# Patient Record
Sex: FEMALE | Race: WHITE | NOT HISPANIC OR LATINO | URBAN - METROPOLITAN AREA
[De-identification: names, ages, dates, MRNs, and addresses within clinical notes are randomized per-mention and may not be internally consistent; named-entity substitution may affect disease eponyms.]

---

## 2024-07-08 ENCOUNTER — OFFICE VISIT (OUTPATIENT)
Dept: URBAN - METROPOLITAN AREA CLINIC 70 | Facility: CLINIC | Age: 69
End: 2024-07-08
Payer: MEDICARE

## 2024-07-08 ENCOUNTER — LAB OUTSIDE AN ENCOUNTER (OUTPATIENT)
Dept: URBAN - METROPOLITAN AREA CLINIC 70 | Facility: CLINIC | Age: 69
End: 2024-07-08

## 2024-07-08 ENCOUNTER — DASHBOARD ENCOUNTERS (OUTPATIENT)
Age: 69
End: 2024-07-08

## 2024-07-08 VITALS
HEART RATE: 82 BPM | HEIGHT: 63 IN | BODY MASS INDEX: 34.41 KG/M2 | DIASTOLIC BLOOD PRESSURE: 94 MMHG | SYSTOLIC BLOOD PRESSURE: 145 MMHG | TEMPERATURE: 97.7 F | WEIGHT: 194.2 LBS

## 2024-07-08 DIAGNOSIS — K59.04 CHRONIC IDIOPATHIC CONSTIPATION: ICD-10-CM

## 2024-07-08 DIAGNOSIS — N83.8 OVARIAN MASS: ICD-10-CM

## 2024-07-08 DIAGNOSIS — K80.20 CHOLELITHIASIS: ICD-10-CM

## 2024-07-08 DIAGNOSIS — R10.11 RUQ ABDOMINAL PAIN: ICD-10-CM

## 2024-07-08 PROCEDURE — 99204 OFFICE O/P NEW MOD 45 MIN: CPT | Performed by: REGISTERED NURSE

## 2024-07-08 RX ORDER — GLIPIZIDE 5 MG/1
1 TABLET 30 MINUTES BEFORE BREAKFAST TABLET ORAL ONCE A DAY
Status: ACTIVE | COMMUNITY

## 2024-07-08 RX ORDER — ALPRAZOLAM 1 MG/1
TAKE 1 TABLET (1 MG) BY ORAL ROUTE 2 TIMES PER DAY TABLET ORAL
Qty: 0 | Refills: 0 | Status: DISCONTINUED | COMMUNITY
Start: 1900-01-01

## 2024-07-08 RX ORDER — ATENOLOL 100 MG/1
TAKE 1 TABLET (50 MG) BY ORAL ROUTE ONCE DAILY TABLET ORAL 1
Qty: 0 | Refills: 0 | Status: ACTIVE | COMMUNITY
Start: 1900-01-01

## 2024-07-08 RX ORDER — LEVOTHYROXINE SODIUM 112 UG/1
1 TABLET IN THE MORNING ON AN EMPTY STOMACH TABLET ORAL ONCE A DAY
Status: ACTIVE | COMMUNITY

## 2024-07-08 RX ORDER — AMLODIPINE AND BENAZEPRIL HYDROCHLORIDE 2.5; 1 MG/1; MG/1
CAPSULE ORAL
Qty: 100 CAPSULE | Status: ACTIVE | COMMUNITY

## 2024-07-08 RX ORDER — OMEPRAZOLE 40 MG/1
1 CAPSULE 30 MINUTES BEFORE MORNING MEAL CAPSULE, DELAYED RELEASE ORAL ONCE A DAY
Qty: 90 | Refills: 3 | OUTPATIENT

## 2024-07-08 RX ORDER — LISINOPRIL 20 MG/1
TAKE 1 TABLET (20 MG) BY ORAL ROUTE ONCE DAILY TABLET ORAL 1
Qty: 0 | Refills: 0 | Status: DISCONTINUED | COMMUNITY
Start: 1900-01-01

## 2024-07-08 RX ORDER — OMEPRAZOLE 20 MG/1
1 CAPSULE 30 MINUTES BEFORE MORNING MEAL CAPSULE, DELAYED RELEASE ORAL ONCE A DAY
Status: ACTIVE | COMMUNITY

## 2024-07-08 RX ORDER — ATORVASTATIN CALCIUM 10 MG/1
1 TABLET TABLET, FILM COATED ORAL ONCE A DAY
Status: ACTIVE | COMMUNITY

## 2024-07-08 RX ORDER — PANTOPRAZOLE SODIUM 40 MG/1
TAKE 1 TABLET (40 MG) BY ORAL ROUTE ONCE DAILY TABLET, DELAYED RELEASE ORAL 1
Qty: 0 | Refills: 0 | Status: DISCONTINUED | COMMUNITY
Start: 1900-01-01

## 2024-07-08 RX ORDER — METFORMIN HYDROCHLORIDE 500 MG/1
TAKE 1 TABLET (500 MG) BY ORAL ROUTE 2 TIMES PER DAY WITH MORNING AND EVENING MEALS TABLET, COATED ORAL 2
Qty: 0 | Refills: 0 | Status: ACTIVE | COMMUNITY
Start: 1900-01-01

## 2024-07-08 RX ORDER — VENLAFAXINE HYDROCHLORIDE 37.5 MG/1
1 TABLET WITH FOOD TABLET ORAL ONCE A DAY
Status: ACTIVE | COMMUNITY

## 2024-07-08 RX ORDER — PIOGLITAZONE 15 MG/1
1 TABLET TABLET ORAL ONCE A DAY
Status: ACTIVE | COMMUNITY

## 2024-07-08 NOTE — HPI-TODAY'S VISIT:
Pt presents with abdominal pain. Pain has been present for 2-3 months but has progressively worsened. Pain is located in the RUQ. Pain occurs for hours at a time. Pain is described as sharp. Pain is aggravated by eating. Pain is alleviated by nothing in particular. Pain occurs daily. Associated symptoms are occasional nausea. She has been taking omeprazole 20mg daily for a month with no improvement.  She was seen in the ED 6/26/24 for the pain. HGB, lipase, and LFTs were normal. CT scan showed fatty liver, gallstones, moderate stool burden, diverticulosis, and a 5 x 5cm fluid filled structure in the right adnexa region.  Gallbladder US showed gallstones and minimal ascites adjacent to the hepatic margin Pelvic US showed a complex right adnexal mass. She was seen by a general surgeon and is scheduled for a hidascan. She has an appointment with gynecology next week.  She also reports chronic constipation. Bowels move every 3-4 days.  EGD 1/8/19 showed nonbleeding gastric ulcers and candida esophagitis She reports a normal colonoscopy in 2015.

## 2024-07-17 ENCOUNTER — CLAIMS CREATED FROM THE CLAIM WINDOW (OUTPATIENT)
Dept: URBAN - METROPOLITAN AREA SURGERY CENTER 24 | Facility: SURGERY CENTER | Age: 69
End: 2024-07-17
Payer: MEDICARE

## 2024-07-17 ENCOUNTER — CLAIMS CREATED FROM THE CLAIM WINDOW (OUTPATIENT)
Dept: URBAN - METROPOLITAN AREA CLINIC 4 | Facility: CLINIC | Age: 69
End: 2024-07-17
Payer: MEDICARE

## 2024-07-17 DIAGNOSIS — K31.89 OTHER DISEASES OF STOMACH AND DUODENUM: ICD-10-CM

## 2024-07-17 DIAGNOSIS — R10.13 ABDOMINAL DISCOMFORT, EPIGASTRIC: ICD-10-CM

## 2024-07-17 DIAGNOSIS — K31.89 ACHYLIA: ICD-10-CM

## 2024-07-17 DIAGNOSIS — K29.70 GASTRITIS, UNSPECIFIED, WITHOUT BLEEDING: ICD-10-CM

## 2024-07-17 PROCEDURE — 88305 TISSUE EXAM BY PATHOLOGIST: CPT | Performed by: PATHOLOGY

## 2024-07-17 PROCEDURE — 43239 EGD BIOPSY SINGLE/MULTIPLE: CPT | Performed by: INTERNAL MEDICINE

## 2024-07-17 PROCEDURE — 00731 ANES UPR GI NDSC PX NOS: CPT | Performed by: NURSE ANESTHETIST, CERTIFIED REGISTERED

## 2024-07-17 RX ORDER — OMEPRAZOLE 40 MG/1
1 CAPSULE 30 MINUTES BEFORE MORNING MEAL CAPSULE, DELAYED RELEASE ORAL ONCE A DAY
Qty: 90 | Refills: 3 | Status: ACTIVE | COMMUNITY

## 2024-07-17 RX ORDER — ATORVASTATIN CALCIUM 10 MG/1
1 TABLET TABLET, FILM COATED ORAL ONCE A DAY
Status: ACTIVE | COMMUNITY

## 2024-07-17 RX ORDER — ATENOLOL 100 MG/1
TAKE 1 TABLET (50 MG) BY ORAL ROUTE ONCE DAILY TABLET ORAL 1
Qty: 0 | Refills: 0 | Status: ACTIVE | COMMUNITY
Start: 1900-01-01

## 2024-07-17 RX ORDER — LEVOTHYROXINE SODIUM 112 UG/1
1 TABLET IN THE MORNING ON AN EMPTY STOMACH TABLET ORAL ONCE A DAY
Status: ACTIVE | COMMUNITY

## 2024-07-17 RX ORDER — VENLAFAXINE HYDROCHLORIDE 37.5 MG/1
1 TABLET WITH FOOD TABLET ORAL ONCE A DAY
Status: ACTIVE | COMMUNITY

## 2024-07-17 RX ORDER — PIOGLITAZONE 15 MG/1
1 TABLET TABLET ORAL ONCE A DAY
Status: ACTIVE | COMMUNITY

## 2024-07-17 RX ORDER — GLIPIZIDE 5 MG/1
1 TABLET 30 MINUTES BEFORE BREAKFAST TABLET ORAL ONCE A DAY
Status: ACTIVE | COMMUNITY

## 2024-07-17 RX ORDER — AMLODIPINE AND BENAZEPRIL HYDROCHLORIDE 2.5; 1 MG/1; MG/1
CAPSULE ORAL
Qty: 100 CAPSULE | Status: ACTIVE | COMMUNITY

## 2024-07-17 RX ORDER — METFORMIN HYDROCHLORIDE 500 MG/1
TAKE 1 TABLET (500 MG) BY ORAL ROUTE 2 TIMES PER DAY WITH MORNING AND EVENING MEALS TABLET, COATED ORAL 2
Qty: 0 | Refills: 0 | Status: ACTIVE | COMMUNITY
Start: 1900-01-01

## 2024-08-14 ENCOUNTER — OFFICE VISIT (OUTPATIENT)
Dept: URBAN - METROPOLITAN AREA CLINIC 70 | Facility: CLINIC | Age: 69
End: 2024-08-14

## 2024-08-19 ENCOUNTER — OFFICE VISIT (OUTPATIENT)
Dept: URBAN - METROPOLITAN AREA CLINIC 70 | Facility: CLINIC | Age: 69
End: 2024-08-19
Payer: MEDICARE

## 2024-08-19 VITALS
TEMPERATURE: 98.1 F | BODY MASS INDEX: 33.17 KG/M2 | HEART RATE: 109 BPM | SYSTOLIC BLOOD PRESSURE: 118 MMHG | DIASTOLIC BLOOD PRESSURE: 82 MMHG | HEIGHT: 63 IN | WEIGHT: 187.2 LBS

## 2024-08-19 DIAGNOSIS — K59.04 CHRONIC IDIOPATHIC CONSTIPATION: ICD-10-CM

## 2024-08-19 DIAGNOSIS — K80.20 CHOLELITHIASIS: ICD-10-CM

## 2024-08-19 PROCEDURE — 99213 OFFICE O/P EST LOW 20 MIN: CPT | Performed by: REGISTERED NURSE

## 2024-08-19 RX ORDER — ATORVASTATIN CALCIUM 10 MG/1
1 TABLET TABLET, FILM COATED ORAL ONCE A DAY
Status: ACTIVE | COMMUNITY

## 2024-08-19 RX ORDER — METFORMIN HYDROCHLORIDE 500 MG/1
TAKE 1 TABLET (500 MG) BY ORAL ROUTE 2 TIMES PER DAY WITH MORNING AND EVENING MEALS TABLET, COATED ORAL 2
Qty: 0 | Refills: 0 | Status: ACTIVE | COMMUNITY
Start: 1900-01-01

## 2024-08-19 RX ORDER — VENLAFAXINE HYDROCHLORIDE 37.5 MG/1
1 TABLET WITH FOOD TABLET ORAL ONCE A DAY
Status: ACTIVE | COMMUNITY

## 2024-08-19 RX ORDER — OMEPRAZOLE 40 MG/1
1 CAPSULE 30 MINUTES BEFORE MORNING MEAL CAPSULE, DELAYED RELEASE ORAL ONCE A DAY
Qty: 90 | Refills: 3 | Status: DISCONTINUED | COMMUNITY

## 2024-08-19 RX ORDER — GLIPIZIDE 5 MG/1
1 TABLET 30 MINUTES BEFORE BREAKFAST TABLET ORAL ONCE A DAY
Status: ACTIVE | COMMUNITY

## 2024-08-19 RX ORDER — ATENOLOL 100 MG/1
TAKE 1 TABLET (50 MG) BY ORAL ROUTE ONCE DAILY TABLET ORAL 1
Qty: 0 | Refills: 0 | Status: ACTIVE | COMMUNITY
Start: 1900-01-01

## 2024-08-19 RX ORDER — FAMOTIDINE 20 MG/1
TABLET, FILM COATED ORAL
Qty: 60 TABLET | Status: ACTIVE | COMMUNITY

## 2024-08-19 RX ORDER — LEVOTHYROXINE SODIUM 112 UG/1
1 TABLET IN THE MORNING ON AN EMPTY STOMACH TABLET ORAL ONCE A DAY
Status: ACTIVE | COMMUNITY

## 2024-08-19 RX ORDER — PIOGLITAZONE 15 MG/1
1 TABLET TABLET ORAL ONCE A DAY
Status: ACTIVE | COMMUNITY

## 2024-08-19 RX ORDER — AMLODIPINE AND BENAZEPRIL HYDROCHLORIDE 2.5; 1 MG/1; MG/1
CAPSULE ORAL
Qty: 100 CAPSULE | Status: ACTIVE | COMMUNITY

## 2024-08-19 NOTE — HPI-TODAY'S VISIT:
She had the cholecystectomy on 8/8/24. RUQ has completely resolved. No heartburn or GERD type symptoms. She has stopped taking the omeprazole.  Bowels are moving every day with using Miralax every 2-3 days. She has no new GI complaints.

## 2024-08-19 NOTE — HPI-OTHER HISTORIES
Note from OV 7/8/24: Pt presents with abdominal pain. Pain has been present for 2-3 months but has progressively worsened. Pain is located in the RUQ. Pain occurs for hours at a time. Pain is described as sharp. Pain is aggravated by eating. Pain is alleviated by nothing in particular. Pain occurs daily. Associated symptoms are occasional nausea. She has been taking omeprazole 20mg daily for a month with no improvement.  She was seen in the ED 6/26/24 for the pain. HGB, lipase, and LFTs were normal. CT scan showed fatty liver, gallstones, moderate stool burden, diverticulosis, and a 5 x 5cm fluid filled structure in the right adnexa region.  Gallbladder US showed gallstones and minimal ascites adjacent to the hepatic margin Pelvic US showed a complex right adnexal mass. She was seen by a general surgeon and is scheduled for a hidascan. She has an appointment with gynecology next week.  She also reports chronic constipation. Bowels move every 3-4 days.  EGD 1/8/19 showed nonbleeding gastric ulcers and candida esophagitis She reports a normal colonoscopy in 2015. ------------------------------------------------------

## 2025-03-03 ENCOUNTER — ERX REFILL RESPONSE (OUTPATIENT)
Dept: URBAN - METROPOLITAN AREA CLINIC 70 | Facility: CLINIC | Age: 70
End: 2025-03-03

## 2025-03-03 RX ORDER — OMEPRAZOLE 40 MG/1
TAKE 1 CAPSULE BY MOUTH ONCE  DAILY 1/2 HOUR BEFORE BREAKFAST CAPSULE, DELAYED RELEASE ORAL
Qty: 100 CAPSULE | Refills: 2 | OUTPATIENT

## 2025-03-03 RX ORDER — OMEPRAZOLE 40 MG/1
TAKE 1 CAPSULE BY MOUTH ONCE  DAILY 1/2 HOUR BEFORE BREAKFAST CAPSULE, DELAYED RELEASE ORAL
Qty: 100 CAPSULE | Refills: 3 | OUTPATIENT